# Patient Record
Sex: MALE | Race: WHITE
[De-identification: names, ages, dates, MRNs, and addresses within clinical notes are randomized per-mention and may not be internally consistent; named-entity substitution may affect disease eponyms.]

---

## 2019-12-30 ENCOUNTER — HOSPITAL ENCOUNTER (EMERGENCY)
Dept: HOSPITAL 74 - JER | Age: 49
Discharge: HOME | End: 2019-12-30
Payer: COMMERCIAL

## 2019-12-30 VITALS — SYSTOLIC BLOOD PRESSURE: 139 MMHG | HEART RATE: 79 BPM | DIASTOLIC BLOOD PRESSURE: 88 MMHG | TEMPERATURE: 97.9 F

## 2019-12-30 VITALS — BODY MASS INDEX: 31.8 KG/M2

## 2019-12-30 DIAGNOSIS — K21.9: ICD-10-CM

## 2019-12-30 DIAGNOSIS — H02.841: ICD-10-CM

## 2019-12-30 DIAGNOSIS — H10.13: Primary | ICD-10-CM

## 2019-12-30 DIAGNOSIS — E11.9: ICD-10-CM

## 2019-12-30 DIAGNOSIS — Z79.84: ICD-10-CM

## 2019-12-30 DIAGNOSIS — H02.844: ICD-10-CM

## 2019-12-30 DIAGNOSIS — I10: ICD-10-CM

## 2019-12-30 DIAGNOSIS — E78.00: ICD-10-CM

## 2019-12-30 NOTE — PDOC
Attending Attestation





- Resident


Resident Name: Lexi Guthrie





- ED Attending Attestation


I have performed the following: I have examined & evaluated the patient, The 

case was reviewed & discussed with the resident, I agree w/resident's findings 

& plan





- HPI


HPI: 





12/30/19 10:41


48y/o M diabetic with recent conjunctivitis treated with ofloxacillin and 

improved presents now with several days of progressive b/l upper eyelid swelling

/erythema. no va change, no eye pain, no headache, no f/c. + discharge when 

awakens, normal glucose levels. 





denies known allergies or new exposures, no new contacts. 





- Physicial Exam


PE: 





12/30/19 10:42


vss


well appearing, conversant, nad


b/l upper eyelid edema/erythema/chemosis, no conjunctival erythema/injection/

icterus. perrl, eomi. VA normal.


op clear


no other rash








- Medical Decision Making





12/30/19 10:43


48y/o M diabetic with b/l eyelid swelling, seems most c/w allergic/contact 

conjunctivitis though some surrounding erythema in diabetic raises concern for 

preseptal cellulitis. no evidence for ophthalmitis or scleritis. 


cold compresses, trial of benadryl


continue erythromycin ointment


augmentin abx course


d/c with ophtho f/u, understands return criteria

## 2019-12-30 NOTE — PDOC
History of Present Illness





- General


Chief Complaint: Pain


Stated Complaint: PAIN IN FACE/EYES SWOLLEN


Time Seen by Provider: 12/30/19 07:18





- History of Present Illness


Initial Comments: 


HPI: 


48yo M with PMH of HTN, HLD, DM, essential tremor, GERD presenting with 

bilateral eye swelling and pain. Suffered a corneal abrasion about one month 

ago for which he was treated with ointment. About two weeks ago patient 

developed conjunctivitis in the other eye and was prescribed ofloxacillin eye 

drops. Thereafter, it affected both eyes equally. Initially his symptoms 

improved but other the past 72 hours, they have acutely worsened such that 

patient decided to cut his family vacation to [place] short and drove back 

today. Patients daughter has had a viral illness but without no eye symptoms. 

Patient states he has blurry vision and photophobia. No fevers or chills.





ROS: 


Constitutional: no fever, non chills


HEENT: no throat pain, +eyelid swelling


Cardiovascular: no chest pain, no palpitations


Respiratory: no cough, no shortness of breath


Gastrointestinal: no abdominal pain, no nausea


Genitourinary: no dysuria, no hematuria


Musculoskeletal: no myalgia, no arthralgia


Skin: no rash, no itching


Neurologic: no headache, no weakness





PE: 


General: Awake, alert, and fully oriented, in no acute distress


Head: No signs of trauma


Eyes: PERRL, EOMI, Upper eyelid edematous, erythematous; pain elicited in left 

eye upon leftward movement but this appears to be more consistent with pain 

from swelling


ENT: Moist mucus membranes


Neck: Normal ROM, supple


Lungs: Lungs clear, normal breath sounds


Cardio: Regular rhythm, S1 and S2 present


Abdomen: Soft, nontender


Extremities: Normal range of motion, Distal pulses present


Skin: Warm, dry, normal turgor


Neurologic: Cranial nerves II through XII grossly intact. Normal speech





ED Course/MDM: 


DDX including but not limited to conjunctivitis, allergic reaction, inflammation

, preseptal cellulitis, opthalmitis, scleritis


Presentation most consistent with allergic or contact conjunctivitis


Augmentin


Compresses


Benadryl


Continue eyedrops


To follow up with ophthalmology: patient already has an appointment to be seen 

this afternoon


Return precautions


Stable for discharge


12/30/19 09:26











Past History





- Past Medical History


Allergies/Adverse Reactions: 


 Allergies











Allergy/AdvReac Type Severity Reaction Status Date / Time


 


No Known Allergies Allergy   Verified 12/30/19 05:50











Home Medications: 


Ambulatory Orders





Propranolol HCl 5 mg PO DAILY 07/05/14 


metFORMIN HCL [Glucophage] 750 mg PO BID 07/05/14 


Glipizide [Glipizide ER] 10 mg PO DAILY 02/13/16 


Amoxicillin/Potassium Clav [Augmentin 875-125 Tablet] 1 each PO BID #28 tablet 

12/30/19 


Canagliflozin [Invokana] 300 mg PO DAILY 12/30/19 


Diphenhydramine HCl [Benadryl -] 25 mg PO Q6H PRN #60 capsule 12/30/19 


Dulaglutide [Trulicity] 0.75 mg SQ WEEKLY 12/30/19 


Lisinopril 5 mg PO DAILY 12/30/19 


Pantoprazole Sodium [Protonix -] 20 mg PO DAILY 12/30/19 








COPD: No


Diabetes: Yes


Dialysis: Yes (GERD, ?IBS, hiatal hernia)


HTN: Yes


Hypercholesterolemia: Yes





- Immunization History


Immunization Up to Date: Yes





- Psycho Social/Smoking Cessation Hx


Smoking History: Never smoked


Have you smoked in the past 12 months: No


Hx Alcohol Use: Yes (occasion)


Drug/Substance Use Hx: No


Substance Use Type: None





*Physical Exam





- Vital Signs


 Last Vital Signs











Temp Pulse Resp BP Pulse Ox


 


 98.6 F   72   18   148/86   97 


 


 12/30/19 03:10  12/30/19 03:10  12/30/19 03:10  12/30/19 03:10  12/30/19 03:10














Discharge





- Discharge Information


Problems reviewed: Yes


Clinical Impression/Diagnosis: 


 Eyelid pain of both eyes





Condition: Stable


Disposition: HOME





- Additional Discharge Information


Prescriptions: 


Amoxicillin/Potassium Clav [Augmentin 875-125 Tablet] 1 each PO BID #28 tablet


Diphenhydramine HCl [Benadryl -] 25 mg PO Q6H PRN #60 capsule


 PRN Reason: Pain





- Follow up/Referral


Referrals: 


Toby Gagnon [Primary Care Provider] - 





- Patient Discharge Instructions


Patient Printed Discharge Instructions:  [facility] for Eye Pain


Additional Instructions: 


You came into the emergency department for eye pain





Prescriptions have been sent to your pharmacy including antibiotics. Take as 

instructed. 





Use warm compresses at home. 





Continue using the eye drops you already have. 





You can take over-the-counter tylenol or motrin for pain. Follow the 

instructions on the medication bottle. 





Follow-up with the ophthalmologist we have referred you to in order to discuss 

this ED visit and to further evaluate your symptoms. Your care is not complete 

until you do so. Call and make an appointment. 





Immediate medical attention is required if you have: you develop worsening pain

, high fevers, or any new or concerning symptoms.  If you think you are having 

an emergency, call for emergency medical services or present to the emergency 

department right away








- Post Discharge Activity


Work/Back to School Note:  Back to Work

## 2021-06-23 ENCOUNTER — HOSPITAL ENCOUNTER (EMERGENCY)
Dept: HOSPITAL 74 - JER | Age: 51
LOS: 1 days | Discharge: HOME | End: 2021-06-24
Payer: COMMERCIAL

## 2021-06-23 VITALS — TEMPERATURE: 98.1 F | DIASTOLIC BLOOD PRESSURE: 72 MMHG | HEART RATE: 68 BPM | SYSTOLIC BLOOD PRESSURE: 128 MMHG

## 2021-06-23 VITALS — BODY MASS INDEX: 29.7 KG/M2

## 2021-06-23 DIAGNOSIS — M54.12: Primary | ICD-10-CM

## 2021-06-23 DIAGNOSIS — M54.16: ICD-10-CM

## 2021-06-23 PROCEDURE — 3E0233Z INTRODUCTION OF ANTI-INFLAMMATORY INTO MUSCLE, PERCUTANEOUS APPROACH: ICD-10-PCS

## 2024-08-14 ENCOUNTER — HOSPITAL ENCOUNTER (EMERGENCY)
Dept: HOSPITAL 74 - JER | Age: 54
LOS: 1 days | Discharge: HOME | End: 2024-08-15
Payer: COMMERCIAL

## 2024-08-14 VITALS
SYSTOLIC BLOOD PRESSURE: 136 MMHG | RESPIRATION RATE: 18 BRPM | TEMPERATURE: 97.5 F | HEART RATE: 65 BPM | DIASTOLIC BLOOD PRESSURE: 67 MMHG

## 2024-08-14 VITALS — BODY MASS INDEX: 31.7 KG/M2
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
## 2024-08-14 DIAGNOSIS — R10.9: Primary | ICD-10-CM
